# Patient Record
Sex: FEMALE | Race: ASIAN | NOT HISPANIC OR LATINO
[De-identification: names, ages, dates, MRNs, and addresses within clinical notes are randomized per-mention and may not be internally consistent; named-entity substitution may affect disease eponyms.]

---

## 2017-07-20 ENCOUNTER — APPOINTMENT (OUTPATIENT)
Dept: CARDIOLOGY | Facility: CLINIC | Age: 62
End: 2017-07-20

## 2017-07-20 VITALS
OXYGEN SATURATION: 100 % | BODY MASS INDEX: 24.8 KG/M2 | HEART RATE: 102 BPM | SYSTOLIC BLOOD PRESSURE: 128 MMHG | TEMPERATURE: 101.8 F | DIASTOLIC BLOOD PRESSURE: 83 MMHG | RESPIRATION RATE: 18 BRPM | WEIGHT: 140 LBS

## 2017-07-20 DIAGNOSIS — R50.9 FEVER, UNSPECIFIED: ICD-10-CM

## 2017-07-20 DIAGNOSIS — R53.1 WEAKNESS: ICD-10-CM

## 2017-07-20 RX ORDER — ACETAMINOPHEN 500 MG/1
500 TABLET ORAL EVERY 6 HOURS
Qty: 30 | Refills: 0 | Status: ACTIVE | COMMUNITY
Start: 2017-07-20 | End: 1900-01-01

## 2017-07-23 PROBLEM — R53.1 WEAKNESS: Status: ACTIVE | Noted: 2017-07-23

## 2017-07-23 PROBLEM — R50.9 FEVER: Status: ACTIVE | Noted: 2017-07-20

## 2018-12-06 ENCOUNTER — APPOINTMENT (OUTPATIENT)
Dept: CARDIOLOGY | Facility: CLINIC | Age: 63
End: 2018-12-06
Payer: MEDICAID

## 2018-12-06 VITALS
BODY MASS INDEX: 25.33 KG/M2 | OXYGEN SATURATION: 97 % | TEMPERATURE: 98.7 F | DIASTOLIC BLOOD PRESSURE: 70 MMHG | RESPIRATION RATE: 17 BRPM | HEART RATE: 90 BPM | WEIGHT: 143 LBS | SYSTOLIC BLOOD PRESSURE: 111 MMHG

## 2018-12-06 PROCEDURE — 93015 CV STRESS TEST SUPVJ I&R: CPT

## 2018-12-06 PROCEDURE — 93306 TTE W/DOPPLER COMPLETE: CPT

## 2018-12-06 PROCEDURE — 99214 OFFICE O/P EST MOD 30 MIN: CPT | Mod: 25

## 2019-07-22 NOTE — PHYSICAL EXAM
[General Appearance - Well Developed] : well developed [Normal Appearance] : normal appearance [Well Groomed] : well groomed [General Appearance - Well Nourished] : well nourished [No Deformities] : no deformities [General Appearance - In No Acute Distress] : no acute distress [Normal Conjunctiva] : the conjunctiva exhibited no abnormalities [Eyelids - No Xanthelasma] : the eyelids demonstrated no xanthelasmas [Normal Oral Mucosa] : normal oral mucosa [No Oral Pallor] : no oral pallor [No Oral Cyanosis] : no oral cyanosis [Normal Jugular Venous A Waves Present] : normal jugular venous A waves present [Normal Jugular Venous V Waves Present] : normal jugular venous V waves present [No Jugular Venous Rosas A Waves] : no jugular venous rosas A waves [Respiration, Rhythm And Depth] : normal respiratory rhythm and effort [Exaggerated Use Of Accessory Muscles For Inspiration] : no accessory muscle use [Auscultation Breath Sounds / Voice Sounds] : lungs were clear to auscultation bilaterally [Heart Rate And Rhythm] : heart rate and rhythm were normal [Heart Sounds] : normal S1 and S2 [Arterial Pulses Normal] : the arterial pulses were normal [Edema] : no peripheral edema present [Abdomen Soft] : soft [Abdomen Tenderness] : non-tender [Abdomen Mass (___ Cm)] : no abdominal mass palpated [Abnormal Walk] : normal gait [Gait - Sufficient For Exercise Testing] : the gait was sufficient for exercise testing [Nail Clubbing] : no clubbing of the fingernails [Cyanosis, Localized] : no localized cyanosis [Petechial Hemorrhages (___cm)] : no petechial hemorrhages [] : no ischemic changes [Oriented To Time, Place, And Person] : oriented to person, place, and time [Affect] : the affect was normal [Mood] : the mood was normal [No Anxiety] : not feeling anxious [FreeTextEntry1] : 2/6 STEFANY at apex

## 2019-07-22 NOTE — DISCUSSION/SUMMARY
[FreeTextEntry1] : 63-year-old female with aortic aneurysm (4.3 cm), hyperglycemia and HLD presents for followup.  Patient was last seen on 7/20/17.  Patient underwent an echocardiogram and it showed normal LV function and stable mild dilatation of the ascending aorta (4.3 cm).  Patient reports that he has been experiencing right-sided CP, not related to exertion, painful on palpation, radiating to the back.  Patient denies SOB on exertion, palpitations or syncope.  Patient had a CXR ordered by PCP today. \par \par (1) CP, non-exertional - Patient underwent an echocardiogram and it showed normal LV function without significant valvular pathology.  Patient underwent a treadmill stress test and completed 6 minutes of Son protocol.  There was no ECG evidence of ischemia. Patient appears to be at low risk for having significant CAD.  Patient was reassured.  Her symptom may be musculoskeletal in etiology. \par \par (2) Ascending aortic aneurysm - Patient underwent an echocardiogram and it showed normal LV function and stable mild dilatation of the ascending aorta (4.5 cm), slightly increased compared with previous study.  Patient should have a followup echo in 1 year. \par \par (3) Followup - 1 year.

## 2019-07-22 NOTE — HISTORY OF PRESENT ILLNESS
[FreeTextEntry1] : 63-year-old female with aortic aneurysm (4.3 cm), hyperglycemia and HLD presents for followup.  Patient was last seen on 7/20/17.  Patient underwent an echocardiogram and it showed normal LV function and stable mild dilatation of the ascending aorta (4.3 cm).  Patient reports that he has been experiencing right-sided CP, not related to exertion, painful on palpation, radiating to the back.  Patient denies SOB on exertion, palpitations or syncope.  Patient had a CXR ordered by PCP today.

## 2021-10-08 ENCOUNTER — NON-APPOINTMENT (OUTPATIENT)
Age: 66
End: 2021-10-08

## 2021-10-08 ENCOUNTER — APPOINTMENT (OUTPATIENT)
Dept: CARDIOLOGY | Facility: CLINIC | Age: 66
End: 2021-10-08
Payer: MEDICAID

## 2021-10-08 VITALS
HEART RATE: 77 BPM | DIASTOLIC BLOOD PRESSURE: 75 MMHG | BODY MASS INDEX: 24.27 KG/M2 | OXYGEN SATURATION: 97 % | RESPIRATION RATE: 17 BRPM | TEMPERATURE: 97.6 F | SYSTOLIC BLOOD PRESSURE: 121 MMHG | WEIGHT: 137 LBS

## 2021-10-08 PROCEDURE — ZZZZZ: CPT

## 2021-10-08 PROCEDURE — 99214 OFFICE O/P EST MOD 30 MIN: CPT | Mod: 25

## 2021-10-08 PROCEDURE — 93015 CV STRESS TEST SUPVJ I&R: CPT

## 2021-10-08 PROCEDURE — 93000 ELECTROCARDIOGRAM COMPLETE: CPT | Mod: 59

## 2021-10-08 PROCEDURE — 93306 TTE W/DOPPLER COMPLETE: CPT

## 2021-10-17 NOTE — REASON FOR VISIT
[Symptom and Test Evaluation] : symptom and test evaluation [Dizziness] : dizziness [FreeTextEntry1] : 10/8/21 - Patient reports continuous right sided CP  that radiates to R shoulder that feels full/heavy  that is non-tender. Patient reports vertigo and had brain MRI recently. ECG was done for CP.  I advised patient to undergo an echocardiogram and a treadmill stress test. I advised patient to get chest CTA for aortic aneurysm. \par \par \par \par 12/6/18 - atient reports that he has been experiencing right-sided CP, not related to exertion, painful on palpation, radiating to the back.  Patient denies SOB on exertion, palpitations or syncope.  Patient had a CXR ordered by PCP today.

## 2021-10-17 NOTE — HISTORY OF PRESENT ILLNESS
[FreeTextEntry1] : 66-year-old female with aortic aneurysm (4.3 cm), hyperglycemia and HLD presents for followup.  \par \par Patient was last seen on 12/618 for CP.  Patient underwent an echocardiogram and it showed normal LV function with mild dilatation of the ascending aorta (4.5 cm).   Patient underwent a treadmill stress test and completed 6 minutes of Son protocol.  There was no ECG evidence of ischemia.  \par  \par

## 2021-12-06 ENCOUNTER — APPOINTMENT (OUTPATIENT)
Dept: CARDIOLOGY | Facility: CLINIC | Age: 66
End: 2021-12-06
Payer: MEDICAID

## 2021-12-06 VITALS
HEART RATE: 75 BPM | SYSTOLIC BLOOD PRESSURE: 117 MMHG | OXYGEN SATURATION: 98 % | RESPIRATION RATE: 17 BRPM | WEIGHT: 145 LBS | TEMPERATURE: 97.7 F | DIASTOLIC BLOOD PRESSURE: 67 MMHG | BODY MASS INDEX: 25.69 KG/M2

## 2021-12-06 DIAGNOSIS — R07.89 OTHER CHEST PAIN: ICD-10-CM

## 2021-12-06 PROCEDURE — 99213 OFFICE O/P EST LOW 20 MIN: CPT

## 2021-12-23 ENCOUNTER — NON-APPOINTMENT (OUTPATIENT)
Age: 66
End: 2021-12-23

## 2021-12-29 ENCOUNTER — NON-APPOINTMENT (OUTPATIENT)
Age: 66
End: 2021-12-29

## 2022-02-04 NOTE — PHYSICAL EXAM
[General Appearance - Well Developed] : well developed [Normal Appearance] : normal appearance [Well Groomed] : well groomed [General Appearance - Well Nourished] : well nourished [No Deformities] : no deformities [General Appearance - In No Acute Distress] : no acute distress [Normal Conjunctiva] : the conjunctiva exhibited no abnormalities [Eyelids - No Xanthelasma] : the eyelids demonstrated no xanthelasmas [Normal Oral Mucosa] : normal oral mucosa [No Oral Pallor] : no oral pallor [No Oral Cyanosis] : no oral cyanosis [Normal Jugular Venous A Waves Present] : normal jugular venous A waves present [Normal Jugular Venous V Waves Present] : normal jugular venous V waves present [No Jugular Venous Rosas A Waves] : no jugular venous rosas A waves [Respiration, Rhythm And Depth] : normal respiratory rhythm and effort [Exaggerated Use Of Accessory Muscles For Inspiration] : no accessory muscle use [Auscultation Breath Sounds / Voice Sounds] : lungs were clear to auscultation bilaterally [Heart Rate And Rhythm] : heart rate and rhythm were normal [Heart Sounds] : normal S1 and S2 [Arterial Pulses Normal] : the arterial pulses were normal [Edema] : no peripheral edema present [FreeTextEntry1] : 2/6 STEFANY at apex  [Abdomen Soft] : soft [Abdomen Tenderness] : non-tender [Abdomen Mass (___ Cm)] : no abdominal mass palpated [Abnormal Walk] : normal gait [Gait - Sufficient For Exercise Testing] : the gait was sufficient for exercise testing [Nail Clubbing] : no clubbing of the fingernails [Cyanosis, Localized] : no localized cyanosis [Petechial Hemorrhages (___cm)] : no petechial hemorrhages [] : no ischemic changes [Oriented To Time, Place, And Person] : oriented to person, place, and time [Affect] : the affect was normal [Mood] : the mood was normal [No Anxiety] : not feeling anxious

## 2022-02-04 NOTE — REASON FOR VISIT
[Symptom and Test Evaluation] : symptom and test evaluation [FreeTextEntry1] : 66-year-old female with aortic aneurysm (4.3 cm), hyperglycemia and HLD presents for followup.  \par \par Patient was last seen on 12/618 for CP.  Patient underwent an echocardiogram and it showed normal LV function with mild dilatation of the ascending aorta (4.5 cm).   Patient underwent a treadmill stress test and completed 6 minutes of Son protocol.  There was no ECG evidence of ischemia.   [Dizziness] : dizziness

## 2022-02-04 NOTE — HISTORY OF PRESENT ILLNESS
[FreeTextEntry1] : 12/6/21- Patient reports feeling stable. Patient was awaiting approval for CT approval but was not notified. We will get insurance approval again. \par  \par 10/8/21 - Patient reports continuous right sided CP  that radiates to R shoulder that feels full/heavy  that is non-tender. Patient reports vertigo and had brain MRI recently. ECG was done for CP.  I advised patient to undergo an echocardiogram and a treadmill stress test. I advised patient to get chest CTA for aortic aneurysm. \par \par \par \par 12/6/18 - atient reports that he has been experiencing right-sided CP, not related to exertion, painful on palpation, radiating to the back.  Patient denies SOB on exertion, palpitations or syncope.  Patient had a CXR ordered by PCP today.

## 2023-06-19 ENCOUNTER — APPOINTMENT (OUTPATIENT)
Dept: CARDIOLOGY | Facility: CLINIC | Age: 68
End: 2023-06-19
Payer: MEDICARE

## 2023-06-19 VITALS
DIASTOLIC BLOOD PRESSURE: 76 MMHG | RESPIRATION RATE: 18 BRPM | SYSTOLIC BLOOD PRESSURE: 125 MMHG | OXYGEN SATURATION: 98 % | BODY MASS INDEX: 24.62 KG/M2 | WEIGHT: 139 LBS | HEART RATE: 74 BPM | TEMPERATURE: 98 F

## 2023-06-19 PROCEDURE — 93000 ELECTROCARDIOGRAM COMPLETE: CPT

## 2023-06-19 PROCEDURE — 93306 TTE W/DOPPLER COMPLETE: CPT

## 2023-06-19 PROCEDURE — 99214 OFFICE O/P EST MOD 30 MIN: CPT

## 2023-06-28 NOTE — HISTORY OF PRESENT ILLNESS
[FreeTextEntry1] : 6/19/23 - Patient underwent Chest CTA in December 2021,Chest CTA showed mildly dilated ascending aorta without dissection. Patient denies CP, SOB, palpitations, or lightheadedness. I advised patient to undergo an echocardiogram ( aortic aneurysm ( 4.5 cm). FU in 1 year. \par \par 12/6/21- Patient reports feeling stable. Patient was awaiting approval for CT approval but was not notified. We will get insurance approval again. \par  \par 10/8/21 - Patient reports continuous right sided CP  that radiates to R shoulder that feels full/heavy  that is non-tender. Patient reports vertigo and had brain MRI recently. ECG was done for CP.  I advised patient to undergo an echocardiogram and a treadmill stress test. I advised patient to get chest CTA for aortic aneurysm. \par \par \par \par 12/6/18 - atient reports that he has been experiencing right-sided CP, not related to exertion, painful on palpation, radiating to the back.  Patient denies SOB on exertion, palpitations or syncope.  Patient had a CXR ordered by PCP today.

## 2023-06-28 NOTE — REASON FOR VISIT
[Symptom and Test Evaluation] : symptom and test evaluation [Dizziness] : dizziness [FreeTextEntry1] : 66-year-old female with aortic aneurysm (4.3 cm), hyperglycemia and HLD presents for followup.  \par \par Patient was last seen on 12/618 for CP.  Patient underwent an echocardiogram and it showed normal LV function with mild dilatation of the ascending aorta (4.5 cm).   Patient underwent a treadmill stress test and completed 6 minutes of Son protocol.  There was no ECG evidence of ischemia.

## 2024-06-03 ENCOUNTER — APPOINTMENT (OUTPATIENT)
Dept: CARDIOLOGY | Facility: CLINIC | Age: 69
End: 2024-06-03
Payer: MEDICARE

## 2024-06-03 VITALS
RESPIRATION RATE: 16 BRPM | BODY MASS INDEX: 24.8 KG/M2 | SYSTOLIC BLOOD PRESSURE: 119 MMHG | HEART RATE: 89 BPM | DIASTOLIC BLOOD PRESSURE: 70 MMHG | WEIGHT: 140 LBS | OXYGEN SATURATION: 97 %

## 2024-06-03 DIAGNOSIS — I71.9 AORTIC ANEURYSM OF UNSPECIFIED SITE, W/OUT RUPTURE: ICD-10-CM

## 2024-06-03 PROCEDURE — 99214 OFFICE O/P EST MOD 30 MIN: CPT

## 2024-06-03 PROCEDURE — 93306 TTE W/DOPPLER COMPLETE: CPT

## 2024-06-03 NOTE — REASON FOR VISIT
[FreeTextEntry1] : 68 year-old female with aortic aneurysm (4.3 cm), hyperglycemia and HLD presents for followup.    Patient was last seen on 6/19/23 - Patient underwent Chest CTA in December 2021,Chest CTA showed mildly dilated ascending aorta without dissection. Patient denies CP, SOB, palpitations, or lightheadedness. I advised patient to undergo an echocardiogram ( aortic aneurysm ( 4.5 cm). FU in 1 year.  Patient underwent an echocardiogram and it showed normal LV function, mild dilatation of the ascending aorta (4.5 cm), without significant valvular pathology.   Patient underwent an echocardiogram 12/618  and it showed normal LV function with mild dilatation of the ascending aorta (4.5 cm).     Patient underwent a treadmill stress test 12/618 and completed 6 minutes of Son protocol.  There was no ECG evidence of ischemia.

## 2024-06-03 NOTE — HISTORY OF PRESENT ILLNESS
[FreeTextEntry1] : 6/3/24 - Patient denies CP, SOB, palpitations, or lightheadedness. He is on ASA 81 mg and statins. I advised patient to undergo an echocardiogram.   6/19/23 - Patient underwent Chest CTA in December 2021,Chest CTA showed mildly dilated ascending aorta without dissection. Patient denies CP, SOB, palpitations, or lightheadedness. I advised patient to undergo an echocardiogram ( aortic aneurysm ( 4.5 cm). FU in 1 year.   12/6/21- Patient reports feeling stable. Patient was awaiting approval for CT approval but was not notified. We will get insurance approval again.    10/8/21 - Patient reports continuous right sided CP  that radiates to R shoulder that feels full/heavy  that is non-tender. Patient reports vertigo and had brain MRI recently. ECG was done for CP.  I advised patient to undergo an echocardiogram and a treadmill stress test. I advised patient to get chest CTA for aortic aneurysm.   12/6/18 - atient reports that he has been experiencing right-sided CP, not related to exertion, painful on palpation, radiating to the back.  Patient denies SOB on exertion, palpitations or syncope.  Patient had a CXR ordered by PCP today.

## 2025-06-23 ENCOUNTER — APPOINTMENT (OUTPATIENT)
Dept: CARDIOLOGY | Facility: CLINIC | Age: 70
End: 2025-06-23
Payer: MEDICARE

## 2025-06-23 ENCOUNTER — NON-APPOINTMENT (OUTPATIENT)
Age: 70
End: 2025-06-23

## 2025-06-23 VITALS
BODY MASS INDEX: 24.98 KG/M2 | SYSTOLIC BLOOD PRESSURE: 107 MMHG | HEART RATE: 73 BPM | OXYGEN SATURATION: 96 % | DIASTOLIC BLOOD PRESSURE: 69 MMHG | RESPIRATION RATE: 18 BRPM | WEIGHT: 141 LBS

## 2025-06-23 PROCEDURE — 99214 OFFICE O/P EST MOD 30 MIN: CPT | Mod: 25

## 2025-06-23 PROCEDURE — 93306 TTE W/DOPPLER COMPLETE: CPT

## 2025-09-04 ENCOUNTER — APPOINTMENT (OUTPATIENT)
Dept: CARDIOLOGY | Facility: CLINIC | Age: 70
End: 2025-09-04
Payer: MEDICARE

## 2025-09-04 VITALS
HEART RATE: 65 BPM | OXYGEN SATURATION: 97 % | RESPIRATION RATE: 18 BRPM | DIASTOLIC BLOOD PRESSURE: 77 MMHG | WEIGHT: 142 LBS | SYSTOLIC BLOOD PRESSURE: 133 MMHG | BODY MASS INDEX: 25.15 KG/M2

## 2025-09-04 DIAGNOSIS — I71.9 AORTIC ANEURYSM OF UNSPECIFIED SITE, W/OUT RUPTURE: ICD-10-CM

## 2025-09-04 DIAGNOSIS — R07.89 OTHER CHEST PAIN: ICD-10-CM

## 2025-09-04 PROCEDURE — 93015 CV STRESS TEST SUPVJ I&R: CPT

## 2025-09-04 PROCEDURE — 99214 OFFICE O/P EST MOD 30 MIN: CPT | Mod: 25

## 2025-09-04 RX ORDER — NITROGLYCERIN 0.4 MG/1
0.4 TABLET SUBLINGUAL
Qty: 1 | Refills: 0 | Status: ACTIVE | COMMUNITY
Start: 2025-09-04 | End: 1900-01-01